# Patient Record
Sex: FEMALE | Race: BLACK OR AFRICAN AMERICAN | NOT HISPANIC OR LATINO | URBAN - METROPOLITAN AREA
[De-identification: names, ages, dates, MRNs, and addresses within clinical notes are randomized per-mention and may not be internally consistent; named-entity substitution may affect disease eponyms.]

---
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
## 2021-01-03 ENCOUNTER — EMERGENCY (EMERGENCY)
Facility: HOSPITAL | Age: 21
LOS: 1 days | End: 2021-01-03
Admitting: EMERGENCY MEDICINE
Payer: MEDICAID

## 2021-01-03 PROCEDURE — 99284 EMERGENCY DEPT VISIT MOD MDM: CPT

## 2021-01-08 ENCOUNTER — EMERGENCY (EMERGENCY)
Facility: HOSPITAL | Age: 21
LOS: 1 days | End: 2021-01-08
Admitting: EMERGENCY MEDICINE
Payer: MEDICAID

## 2021-01-08 PROCEDURE — 99284 EMERGENCY DEPT VISIT MOD MDM: CPT

## 2021-01-09 ENCOUNTER — TRANSCRIPTION ENCOUNTER (OUTPATIENT)
Age: 21
End: 2021-01-09

## 2021-04-14 ENCOUNTER — APPOINTMENT (OUTPATIENT)
Dept: ANTEPARTUM | Facility: CLINIC | Age: 21
End: 2021-04-14

## 2021-05-10 ENCOUNTER — EMERGENCY (EMERGENCY)
Facility: HOSPITAL | Age: 21
LOS: 1 days | End: 2021-05-10
Admitting: EMERGENCY MEDICINE
Payer: MEDICAID

## 2021-05-10 PROCEDURE — 99284 EMERGENCY DEPT VISIT MOD MDM: CPT

## 2021-05-28 ENCOUNTER — OUTPATIENT (OUTPATIENT)
Dept: INPATIENT UNIT | Facility: HOSPITAL | Age: 21
LOS: 1 days | End: 2021-05-28
Payer: MEDICAID

## 2021-05-28 PROCEDURE — L9996: CPT

## 2021-05-28 PROCEDURE — 99213 OFFICE O/P EST LOW 20 MIN: CPT

## 2021-07-05 ENCOUNTER — EMERGENCY (EMERGENCY)
Facility: HOSPITAL | Age: 21
LOS: 1 days | End: 2021-07-05
Admitting: EMERGENCY MEDICINE
Payer: MEDICAID

## 2021-07-05 PROCEDURE — L9991: CPT

## 2021-08-19 ENCOUNTER — APPOINTMENT (OUTPATIENT)
Dept: OBGYN | Facility: CLINIC | Age: 21
End: 2021-08-19

## 2021-09-06 ENCOUNTER — EMERGENCY (EMERGENCY)
Facility: HOSPITAL | Age: 21
LOS: 1 days | End: 2021-09-06
Admitting: EMERGENCY MEDICINE
Payer: MEDICAID

## 2021-09-06 PROCEDURE — L9992: CPT

## 2021-11-25 ENCOUNTER — EMERGENCY (EMERGENCY)
Facility: HOSPITAL | Age: 21
LOS: 1 days | End: 2021-11-25
Admitting: EMERGENCY MEDICINE
Payer: MEDICAID

## 2021-11-25 PROCEDURE — 99285 EMERGENCY DEPT VISIT HI MDM: CPT

## 2021-11-25 PROCEDURE — 76856 US EXAM PELVIC COMPLETE: CPT | Mod: 26,59

## 2021-11-25 PROCEDURE — 76830 TRANSVAGINAL US NON-OB: CPT | Mod: 26

## 2021-11-25 PROCEDURE — 93975 VASCULAR STUDY: CPT | Mod: 26

## 2022-06-24 ENCOUNTER — OUTPATIENT (OUTPATIENT)
Dept: OUTPATIENT SERVICES | Facility: HOSPITAL | Age: 22
LOS: 1 days | End: 2022-06-24

## 2022-07-06 DIAGNOSIS — Z02.1 ENCOUNTER FOR PRE-EMPLOYMENT EXAMINATION: ICD-10-CM

## 2022-09-01 ENCOUNTER — EMERGENCY (EMERGENCY)
Facility: HOSPITAL | Age: 22
LOS: 1 days | Discharge: HOME | End: 2022-09-01
Admitting: EMERGENCY MEDICINE

## 2022-09-01 ENCOUNTER — OFFICE (OUTPATIENT)
Dept: URBAN - METROPOLITAN AREA CLINIC 8 | Facility: CLINIC | Age: 22
Setting detail: OPHTHALMOLOGY
End: 2022-09-01
Payer: MEDICAID

## 2022-09-01 DIAGNOSIS — S05.01XA: ICD-10-CM

## 2022-09-01 DIAGNOSIS — R51.9 HEADACHE, UNSPECIFIED: ICD-10-CM

## 2022-09-01 DIAGNOSIS — H10.45: ICD-10-CM

## 2022-09-01 DIAGNOSIS — G43.009: ICD-10-CM

## 2022-09-01 DIAGNOSIS — H53.8 OTHER VISUAL DISTURBANCES: ICD-10-CM

## 2022-09-01 PROCEDURE — 99203 OFFICE O/P NEW LOW 30 MIN: CPT | Performed by: OPHTHALMOLOGY

## 2022-09-01 PROCEDURE — 99283 EMERGENCY DEPT VISIT LOW MDM: CPT

## 2022-09-01 ASSESSMENT — KERATOMETRY
OD_K1POWER_DIOPTERS: 40.50
OS_K2POWER_DIOPTERS: 41.75
OD_K2POWER_DIOPTERS: 42.25
OS_K1POWER_DIOPTERS: 40.75
OS_AXISANGLE_DEGREES: 100
OD_AXISANGLE_DEGREES: 080
METHOD_AUTO_MANUAL: AUTO

## 2022-09-01 ASSESSMENT — SPHEQUIV_DERIVED
OS_SPHEQUIV: -1.75
OD_SPHEQUIV: 0.125
OD_SPHEQUIV: 0.125
OS_SPHEQUIV: -1.75

## 2022-09-01 ASSESSMENT — REFRACTION_MANIFEST
OS_SPHERE: -1.50
OS_VA1: 20/20
OS_CYLINDER: -0.50
OD_SPHERE: +0.50
OD_VA1: 20/25
OD_AXIS: 160
OS_AXIS: 020
OD_CYLINDER: -0.75

## 2022-09-01 ASSESSMENT — VISUAL ACUITY
OD_BCVA: 20/250
OS_BCVA: 20/25-2

## 2022-09-01 ASSESSMENT — AXIALLENGTH_DERIVED
OD_AL: 24.3472
OD_AL: 24.3472
OS_AL: 25.2032
OS_AL: 25.2032

## 2022-09-01 ASSESSMENT — REFRACTION_AUTOREFRACTION
OS_CYLINDER: -0.50
OS_AXIS: 020
OD_AXIS: 160
OD_CYLINDER: -0.75
OS_SPHERE: -1.50
OD_SPHERE: +0.50

## 2022-09-01 ASSESSMENT — CORNEAL TRAUMA - ABRASION: OD_ABRASION: PRESENT

## 2022-09-01 ASSESSMENT — CONFRONTATIONAL VISUAL FIELD TEST (CVF)
OS_FINDINGS: FULL
OD_FINDINGS: FULL

## 2022-09-05 ENCOUNTER — EMERGENCY (EMERGENCY)
Facility: HOSPITAL | Age: 22
LOS: 1 days | Discharge: HOME | End: 2022-09-05
Admitting: EMERGENCY MEDICINE

## 2022-09-05 DIAGNOSIS — R10.9 UNSPECIFIED ABDOMINAL PAIN: ICD-10-CM

## 2022-09-05 DIAGNOSIS — R07.89 OTHER CHEST PAIN: ICD-10-CM

## 2022-09-05 DIAGNOSIS — R51.9 HEADACHE, UNSPECIFIED: ICD-10-CM

## 2022-09-05 PROCEDURE — 99283 EMERGENCY DEPT VISIT LOW MDM: CPT

## 2022-09-05 PROCEDURE — 93010 ELECTROCARDIOGRAM REPORT: CPT

## 2022-09-06 ENCOUNTER — RX ONLY (RX ONLY)
Age: 22
End: 2022-09-06

## 2022-09-06 ENCOUNTER — OFFICE (OUTPATIENT)
Dept: URBAN - METROPOLITAN AREA CLINIC 38 | Facility: CLINIC | Age: 22
Setting detail: OPHTHALMOLOGY
End: 2022-09-06
Payer: MEDICAID

## 2022-09-06 DIAGNOSIS — H10.45: ICD-10-CM

## 2022-09-06 DIAGNOSIS — S05.01XD: ICD-10-CM

## 2022-09-06 PROBLEM — G43.009 MIGRAINE-W/O AURA: Status: ACTIVE | Noted: 2022-09-01

## 2022-09-06 PROCEDURE — 99213 OFFICE O/P EST LOW 20 MIN: CPT | Performed by: OPHTHALMOLOGY

## 2022-09-06 ASSESSMENT — CONFRONTATIONAL VISUAL FIELD TEST (CVF)
OD_FINDINGS: FULL
OS_FINDINGS: FULL

## 2022-09-06 ASSESSMENT — REFRACTION_AUTOREFRACTION
OD_AXIS: 160
OD_SPHERE: +0.50
OS_CYLINDER: -0.50
OS_SPHERE: -1.50
OS_AXIS: 020
OD_CYLINDER: -0.75

## 2022-09-06 ASSESSMENT — KERATOMETRY
OD_AXISANGLE_DEGREES: 080
OS_K2POWER_DIOPTERS: 41.75
OS_AXISANGLE_DEGREES: 100
OD_K2POWER_DIOPTERS: 42.25
METHOD_AUTO_MANUAL: AUTO
OS_K1POWER_DIOPTERS: 40.75
OD_K1POWER_DIOPTERS: 40.50

## 2022-09-06 ASSESSMENT — AXIALLENGTH_DERIVED
OS_AL: 25.2032
OD_AL: 24.3472
OS_AL: 25.2032
OD_AL: 24.3472

## 2022-09-06 ASSESSMENT — REFRACTION_MANIFEST
OS_VA1: 20/20
OS_CYLINDER: -0.50
OS_SPHERE: -1.50
OD_AXIS: 160
OD_VA1: 20/25
OD_CYLINDER: -0.75
OS_AXIS: 020
OD_SPHERE: +0.50

## 2022-09-06 ASSESSMENT — SPHEQUIV_DERIVED
OS_SPHEQUIV: -1.75
OD_SPHEQUIV: 0.125
OD_SPHEQUIV: 0.125
OS_SPHEQUIV: -1.75

## 2022-09-06 ASSESSMENT — CORNEAL TRAUMA - ABRASION: OD_ABRASION: ABSENT

## 2022-09-06 ASSESSMENT — VISUAL ACUITY
OS_BCVA: 20/20-2
OD_BCVA: 20/150

## 2023-10-18 ENCOUNTER — RX ONLY (RX ONLY)
Age: 23
End: 2023-10-18

## 2023-10-18 ENCOUNTER — OFFICE (OUTPATIENT)
Dept: URBAN - METROPOLITAN AREA CLINIC 38 | Facility: CLINIC | Age: 23
Setting detail: OPHTHALMOLOGY
End: 2023-10-18
Payer: MEDICAID

## 2023-10-18 DIAGNOSIS — H16.223: ICD-10-CM

## 2023-10-18 PROCEDURE — 92014 COMPRE OPH EXAM EST PT 1/>: CPT

## 2023-10-18 ASSESSMENT — TEAR BREAK UP TIME (TBUT)
OS_TBUT: 1S
OD_TBUT: 1S

## 2023-10-18 ASSESSMENT — TONOMETRY
OS_IOP_MMHG: 17
OD_IOP_MMHG: 17

## 2023-10-18 ASSESSMENT — CONFRONTATIONAL VISUAL FIELD TEST (CVF)
OS_FINDINGS: FULL
OD_FINDINGS: FULL

## 2023-10-20 ASSESSMENT — REFRACTION_MANIFEST
OS_CYLINDER: -0.50
OU_VA: 20/20
OD_SPHERE: PLANO
OS_AXIS: 030
OD_AXIS: 170
OS_AXIS: 030
OD_SPHERE: PLANO
OD_VA1: 20/20
OU_VA: 20/20
OS_SPHERE: -2.00
OD_CYLINDER: -0.50
OS_CYLINDER: -0.50
OD_CYLINDER: -0.50
OS_SPHERE: -2.00
OD_AXIS: 170
OS_VA1: 20/20
OS_VA1: 20/20
OD_VA1: 20/20

## 2023-10-20 ASSESSMENT — AXIALLENGTH_DERIVED
OS_AL: 25.3742
OS_AL: 25.1509
OS_AL: 25.3742
OD_AL: 24.3962
OS_AL: 25.3742

## 2023-10-20 ASSESSMENT — SPHEQUIV_DERIVED
OS_SPHEQUIV: -2.25
OS_SPHEQUIV: -1.75
OS_SPHEQUIV: -2.25
OD_SPHEQUIV: 0.125
OS_SPHEQUIV: -2.25

## 2023-10-20 ASSESSMENT — REFRACTION_AUTOREFRACTION
OD_CYLINDER: -0.50
OD_AXIS: 168
OD_SPHERE: +0.50
OD_AXIS: 160
OS_SPHERE: -1.50
OD_CYLINDER: -0.75
OS_CYLINDER: -0.50
OS_SPHERE: -2.00
OS_CYLINDER: -0.50
OS_AXIS: 020
OS_AXIS: 029
OD_SPHERE: PLANO

## 2023-10-20 ASSESSMENT — REFRACTION_CURRENTRX
OS_CYLINDER: -0.50
OS_SPHERE: -2..25
OS_OVR_VA: 20/
OD_SPHERE: PLANO
OD_AXIS: 165
OD_CYLINDER: -0.75
OS_AXIS: 005
OD_OVR_VA: 20/

## 2023-10-20 ASSESSMENT — VISUAL ACUITY
OD_BCVA: 20/150
OS_BCVA: 20/20-2

## 2023-10-20 ASSESSMENT — KERATOMETRY
METHOD_AUTO_MANUAL: AUTO
OS_K2POWER_DIOPTERS: 41.75
OD_K1POWER_DIOPTERS: 40.75
OD_K2POWER_DIOPTERS: 41.75
OS_K1POWER_DIOPTERS: 41.00
OS_AXISANGLE_DEGREES: 102
OD_AXISANGLE_DEGREES: 078

## 2023-10-30 ENCOUNTER — RX ONLY (RX ONLY)
Age: 23
End: 2023-10-30

## 2023-10-30 ENCOUNTER — OFFICE (OUTPATIENT)
Dept: URBAN - METROPOLITAN AREA CLINIC 38 | Facility: CLINIC | Age: 23
Setting detail: OPHTHALMOLOGY
End: 2023-10-30
Payer: MEDICAID

## 2023-10-30 DIAGNOSIS — H16.223: ICD-10-CM

## 2023-10-30 DIAGNOSIS — G43.109: ICD-10-CM

## 2023-10-30 PROCEDURE — 99213 OFFICE O/P EST LOW 20 MIN: CPT

## 2023-10-30 ASSESSMENT — TONOMETRY
OS_IOP_MMHG: 16
OS_IOP_MMHG: 19
OD_IOP_MMHG: 19
OD_IOP_MMHG: 16

## 2023-10-30 ASSESSMENT — SUPERFICIAL PUNCTATE KERATITIS (SPK)
OS_SPK: 1+
OD_SPK: 1+

## 2023-10-30 ASSESSMENT — CONFRONTATIONAL VISUAL FIELD TEST (CVF)
OS_FINDINGS: FULL
OD_FINDINGS: FULL

## 2023-10-30 ASSESSMENT — TEAR BREAK UP TIME (TBUT)
OD_TBUT: 1 SEC
OS_TBUT: 1 SEC

## 2023-11-01 PROBLEM — H53.19 SUBJECTIVE VISUAL DISTURBANCES, OTHER: Status: ACTIVE | Noted: 2023-10-30

## 2023-11-01 ASSESSMENT — REFRACTION_MANIFEST
OD_SPHERE: PLANO
OS_SPHERE: -2.00
OD_AXIS: 170
OS_SPHERE: -2.00
OS_AXIS: 030
OD_SPHERE: PLANO
OD_AXIS: 170
OD_CYLINDER: -0.50
OS_VA1: 20/20
OD_CYLINDER: -0.50
OS_CYLINDER: -0.50
OS_VA1: 20/20
OU_VA: 20/20
OS_CYLINDER: -0.50
OU_VA: 20/20
OD_VA1: 20/20
OD_VA1: 20/20
OS_AXIS: 030

## 2023-11-01 ASSESSMENT — REFRACTION_AUTOREFRACTION
OD_SPHERE: PLANO
OS_SPHERE: -2.00
OD_AXIS: 168
OD_AXIS: 168
OS_AXIS: 020
OS_AXIS: 029
OS_SPHERE: -1.75
OD_CYLINDER: -0.50
OD_CYLINDER: -0.50
OS_CYLINDER: -0.50
OS_CYLINDER: -0.50
OD_SPHERE: PLANO

## 2023-11-01 ASSESSMENT — KERATOMETRY
OS_AXISANGLE_DEGREES: 102
OD_K2POWER_DIOPTERS: 42.00
METHOD_AUTO_MANUAL: AUTO
OD_K1POWER_DIOPTERS: 40.75
OS_K2POWER_DIOPTERS: 41.75
OS_K1POWER_DIOPTERS: 40.75
OD_AXISANGLE_DEGREES: 083

## 2023-11-01 ASSESSMENT — REFRACTION_CURRENTRX
OS_OVR_VA: 20/
OS_SPHERE: -2..25
OD_SPHERE: PLANO
OD_OVR_VA: 20/
OD_AXIS: 165
OD_CYLINDER: -0.75
OS_CYLINDER: -0.50
OS_AXIS: 005

## 2023-11-01 ASSESSMENT — AXIALLENGTH_DERIVED
OS_AL: 25.3148
OS_AL: 25.4275

## 2023-11-01 ASSESSMENT — SPHEQUIV_DERIVED
OS_SPHEQUIV: -2.25
OS_SPHEQUIV: -2
OS_SPHEQUIV: -2.25
OS_SPHEQUIV: -2.25

## 2023-11-01 ASSESSMENT — VISUAL ACUITY
OD_BCVA: 20/20
OS_BCVA: 20/20

## 2024-01-05 ENCOUNTER — APPOINTMENT (OUTPATIENT)
Dept: OBGYN | Facility: CLINIC | Age: 24
End: 2024-01-05